# Patient Record
Sex: MALE | Race: WHITE | NOT HISPANIC OR LATINO | Employment: FULL TIME
[De-identification: names, ages, dates, MRNs, and addresses within clinical notes are randomized per-mention and may not be internally consistent; named-entity substitution may affect disease eponyms.]

---

## 2020-03-01 ENCOUNTER — HEALTH MAINTENANCE LETTER (OUTPATIENT)
Age: 35
End: 2020-03-01

## 2020-12-14 ENCOUNTER — HEALTH MAINTENANCE LETTER (OUTPATIENT)
Age: 35
End: 2020-12-14

## 2021-04-18 ENCOUNTER — HEALTH MAINTENANCE LETTER (OUTPATIENT)
Age: 36
End: 2021-04-18

## 2021-10-02 ENCOUNTER — HEALTH MAINTENANCE LETTER (OUTPATIENT)
Age: 36
End: 2021-10-02

## 2021-11-19 ENCOUNTER — APPOINTMENT (OUTPATIENT)
Dept: ULTRASOUND IMAGING | Facility: CLINIC | Age: 36
End: 2021-11-19
Attending: FAMILY MEDICINE
Payer: COMMERCIAL

## 2021-11-19 ENCOUNTER — HOSPITAL ENCOUNTER (EMERGENCY)
Facility: CLINIC | Age: 36
Discharge: HOME | End: 2021-11-20
Attending: FAMILY MEDICINE | Admitting: FAMILY MEDICINE
Payer: COMMERCIAL

## 2021-11-19 DIAGNOSIS — I83.891 VARICOSE VEINS OF LEG WITH SWELLING, RIGHT: ICD-10-CM

## 2021-11-19 PROCEDURE — 99284 EMERGENCY DEPT VISIT MOD MDM: CPT | Mod: 25 | Performed by: FAMILY MEDICINE

## 2021-11-19 PROCEDURE — 99282 EMERGENCY DEPT VISIT SF MDM: CPT | Performed by: FAMILY MEDICINE

## 2021-11-19 PROCEDURE — 93971 EXTREMITY STUDY: CPT | Mod: RT

## 2021-11-20 VITALS
TEMPERATURE: 98.4 F | HEART RATE: 90 BPM | WEIGHT: 315 LBS | OXYGEN SATURATION: 98 % | RESPIRATION RATE: 18 BRPM | BODY MASS INDEX: 60.43 KG/M2 | SYSTOLIC BLOOD PRESSURE: 148 MMHG | DIASTOLIC BLOOD PRESSURE: 92 MMHG

## 2021-11-20 ASSESSMENT — ENCOUNTER SYMPTOMS
MYALGIAS: 1
FEVER: 0
CHILLS: 0
SHORTNESS OF BREATH: 0
BACK PAIN: 0
PALPITATIONS: 0
CHEST TIGHTNESS: 0
RESPIRATORY NEGATIVE: 1
WEAKNESS: 0
UNEXPECTED WEIGHT CHANGE: 1

## 2021-11-20 NOTE — ED PROVIDER NOTES
History     Chief Complaint   Patient presents with     Leg Pain     HPI  Kota Parada is a 36 year old male who presents to the ER with concerns about swelling and pain in his right lower leg.  The patient states that it started approximately 5 days ago.  Is been gradually getting more intense since.  He states that the pain is most intense just above his ankle area and into the lower leg.  He has never had a history for clots but he is concerned that that could be the reason for his leg swelling and pain.  He denies any specific injury.  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  He is quite active though.  Patient states that his only medical abnormality is his obesity.  He states he has gained about 30 pounds since the onset of Covid.  He denies any history for diabetes or clotting disorder.  Is on no current medications.  He has no allergies to medications.  He denies any shortness of breath or chest pain symptoms associated with the leg pain.  He denies any low back pain associated with his leg pain.  He has a chronic scar to his anterior right lower leg but it has not changed in appearance.  He has had no skin rash or shingles-like lesions associated with his leg.      Allergies:  No Known Allergies    Problem List:    Patient Active Problem List    Diagnosis Date Noted     CARDIOVASCULAR SCREENING; LDL GOAL LESS THAN 130 01/31/2012     Priority: Low        Past Medical History:    No past medical history on file.    Past Surgical History:    No past surgical history on file.    Family History:    Family History   Problem Relation Age of Onset     Diabetes Father      Alcohol/Drug Father      Cardiovascular Brother        Social History:  Marital Status:   [2]  Social History     Tobacco Use     Smoking status: Never Smoker     Smokeless tobacco: Never Used   Substance Use Topics     Alcohol use: Not on file     Drug use: Not on file        Medications:     No current outpatient medications on file.        Review of Systems   Constitutional: Positive for unexpected weight change (increase). Negative for chills and fever.   Respiratory: Negative.  Negative for chest tightness and shortness of breath.    Cardiovascular: Positive for leg swelling (right leg). Negative for chest pain and palpitations.   Musculoskeletal: Positive for myalgias (right lower leg pains). Negative for back pain.   Neurological: Negative for weakness.   All other systems reviewed and are negative.      Physical Exam   BP: (!) 183/112  Pulse: 90  Temp: 98.4  F (36.9  C)  Resp: 18  Weight: (!) 187 kg (412 lb 3.2 oz)  SpO2: 98 %      Physical Exam  Vitals and nursing note reviewed.   Constitutional:       General: He is in acute distress.      Appearance: He is not ill-appearing.   HENT:      Head: Normocephalic and atraumatic.   Eyes:      General: No scleral icterus.     Extraocular Movements: Extraocular movements intact.      Conjunctiva/sclera: Conjunctivae normal.      Pupils: Pupils are equal, round, and reactive to light.   Cardiovascular:      Rate and Rhythm: Normal rate.      Pulses: Normal pulses.      Comments: Normal dorsalis pedis pulse in the right foot.  Pulmonary:      Effort: Pulmonary effort is normal. No respiratory distress.   Musculoskeletal:         General: Tenderness present.      Cervical back: Normal range of motion and neck supple.   Skin:     Capillary Refill: Capillary refill takes less than 2 seconds.   Neurological:      General: No focal deficit present.      Mental Status: He is alert and oriented to person, place, and time.   Psychiatric:         Mood and Affect: Mood normal.         Behavior: Behavior normal.         ED Course        Procedures              Critical Care time:  none               Results for orders placed or performed during the hospital encounter of 11/19/21 (from the past 24 hour(s))   US Lower Extremity Venous Duplex Right    Narrative     EXAM: US LOWER EXTREMITY VENOUS DUPLEX RIGHT  LOCATION: [facility]  DATE/TIME: 11/19/2021 11:35 PM    INDICATION: Swelling and lower right leg pain x 5 days  COMPARISON: None.  TECHNIQUE: Venous Duplex ultrasound of the right lower extremity with and without compression, augmentation and duplex. Color flow and spectral Doppler with waveform analysis performed.    FINDINGS: Exam includes the common femoral, femoral, popliteal, and contralateral common femoral veins as well as segmentally visualized deep calf veins and greater saphenous vein.     RIGHT: No deep vein thrombosis. No superficial thrombophlebitis. No popliteal cyst.      Impression    IMPRESSION:  1.  No deep venous thrombosis in the right lower extremity.           Assessments & Plan (with Medical Decision Making)  Patient with history exam findings consistent with pains in his legs.  No evidence for DVT identified.  Patient was instructed in the prevention of worsening condition and was given written handouts describing things he can do to improve his right leg.  Encouraged to return for increase or worsening symptoms as described on the handouts he was sent home with today.     I have reviewed the nursing notes.    I have reviewed the findings, diagnosis, plan and need for follow up with the patient.       Final diagnoses:   Varicose veins of leg with swelling, right       11/19/2021   [facility] EMERGENCY DEPT     Gordo Odell, DO  11/20/21 3035

## 2022-05-14 ENCOUNTER — HEALTH MAINTENANCE LETTER (OUTPATIENT)
Age: 37
End: 2022-05-14

## 2022-09-03 ENCOUNTER — HEALTH MAINTENANCE LETTER (OUTPATIENT)
Age: 37
End: 2022-09-03

## 2023-06-03 ENCOUNTER — HEALTH MAINTENANCE LETTER (OUTPATIENT)
Age: 38
End: 2023-06-03

## 2024-07-06 ENCOUNTER — HEALTH MAINTENANCE LETTER (OUTPATIENT)
Age: 39
End: 2024-07-06